# Patient Record
Sex: MALE | Race: WHITE | Employment: FULL TIME | URBAN - NONMETROPOLITAN AREA
[De-identification: names, ages, dates, MRNs, and addresses within clinical notes are randomized per-mention and may not be internally consistent; named-entity substitution may affect disease eponyms.]

---

## 2017-03-21 ENCOUNTER — OFFICE VISIT (OUTPATIENT)
Dept: FAMILY MEDICINE | Facility: OTHER | Age: 22
End: 2017-03-21
Attending: FAMILY MEDICINE
Payer: COMMERCIAL

## 2017-03-21 VITALS
OXYGEN SATURATION: 98 % | TEMPERATURE: 97.4 F | BODY MASS INDEX: 24.33 KG/M2 | HEIGHT: 71 IN | WEIGHT: 173.8 LBS | DIASTOLIC BLOOD PRESSURE: 64 MMHG | HEART RATE: 64 BPM | SYSTOLIC BLOOD PRESSURE: 118 MMHG

## 2017-03-21 DIAGNOSIS — Z20.2 SEXUALLY TRANSMITTED DISEASE EXPOSURE: ICD-10-CM

## 2017-03-21 DIAGNOSIS — Z11.8 SPECIAL SCREENING EXAMINATION FOR CHLAMYDIAL DISEASE: Primary | ICD-10-CM

## 2017-03-21 PROBLEM — Z71.89 ADVANCED DIRECTIVES, COUNSELING/DISCUSSION: Chronic | Status: ACTIVE | Noted: 2017-03-21

## 2017-03-21 PROCEDURE — 87389 HIV-1 AG W/HIV-1&-2 AB AG IA: CPT | Mod: 90 | Performed by: FAMILY MEDICINE

## 2017-03-21 PROCEDURE — 99213 OFFICE O/P EST LOW 20 MIN: CPT | Performed by: FAMILY MEDICINE

## 2017-03-21 PROCEDURE — 87591 N.GONORRHOEAE DNA AMP PROB: CPT | Mod: 90 | Performed by: FAMILY MEDICINE

## 2017-03-21 PROCEDURE — 86780 TREPONEMA PALLIDUM: CPT | Mod: 90 | Performed by: FAMILY MEDICINE

## 2017-03-21 PROCEDURE — 87491 CHLMYD TRACH DNA AMP PROBE: CPT | Mod: 90 | Performed by: FAMILY MEDICINE

## 2017-03-21 PROCEDURE — 99000 SPECIMEN HANDLING OFFICE-LAB: CPT | Performed by: FAMILY MEDICINE

## 2017-03-21 PROCEDURE — 36415 COLL VENOUS BLD VENIPUNCTURE: CPT | Performed by: FAMILY MEDICINE

## 2017-03-21 RX ORDER — AZITHROMYCIN 500 MG/1
1000 TABLET, FILM COATED ORAL ONCE
Qty: 2 TABLET | Refills: 0 | Status: SHIPPED | OUTPATIENT
Start: 2017-03-21 | End: 2017-03-21

## 2017-03-21 ASSESSMENT — PAIN SCALES - GENERAL: PAINLEVEL: NO PAIN (0)

## 2017-03-21 NOTE — MR AVS SNAPSHOT
After Visit Summary   3/21/2017    Alec Baires    MRN: [id number]           Patient Information     Date Of Birth          1995        Visit Information        Provider Department      3/21/2017 3:45 PM aMtt Jimenez MD [facility] [place]        Today's Diagnoses     Special screening examination for chlamydial disease    -  1    Sexually transmitted disease exposure          Care Instructions      Chlamydia Urethritis, Treated (Male)  You have an infection in the channel in the penis that carries urine (urethra). The infection is caused by the bacteria chlamydia. This infection is a sexually transmitted disease (STD). It is highly contagious. It's spread by sexual contact with an infected partner.    Symptoms most often begin within 1 week after you come in contact with the bacteria. But it may take 3 weeks for symptoms to show up. You may have a watery discharge from the penis and burning during urination.   This infection can be treated and cured. Treatment is with antibiotic medicine.  Home care  Follow these guidelines when caring for yourself at home:    Your sexual partner needs to be treated even if he or she has no symptoms. Your partner should contact his or her own health care provider. Or your partner can go to an urgent care clinic or your local health department to be examined and treated.    Avoid sexual activity until both you and your partner have finished all antibiotic medicine. You should wait until your provider tells you that you are no longer contagious.    Take all antibiotic medicine as directed until it is finished. If you stop the medicine before you have finished it, symptoms may come back.    Learn about  safe sex  practices and use these in the future. The safest sex is with a partner who has tested negative and has sex only with you. Condoms can help stop spreading some STDs. These include gonorrhea, chlamydia, and HIV. But condoms are not a  guarantee.  Follow-up care  Follow up with your health care provider, or as advised. If a culture test was taken, you may call as advised for the results. You should have another culture test 4 to 6 weeks after treatment, This is to make sure the infection is gone. Call your provider or your local health department for complete STD screening. This includes HIV testing. Call the [facility] National STD Hotline at 549-050-6262 for more information about STDs.  When to seek medical advice  Call your health care provider right away if any of these occur:    You don t get better after 3 days of treatment    You can t urinate because of the pain    Rash or joint pain    Painful sores on the penis    Enlarged painful lumps (lymph nodes) in the groin    Testicle pain or swelling of the scrotum    Fever greater than 101 F (38.3 C).    Blood in urine     2633-1692 [employer]. [address] [place] [address]. All rights reserved. This information is not intended as a substitute for professional medical care. Always follow your healthcare professional's instructions.        Chlamydia  Chlamydia is a very common sexually transmitted disease (STD). Most people do not have symptoms. Because of this, chlamydia may not be noticed until it causes severe problems. Left untreated, this infection can cause women and men to become sterile. This means they will not be able to have children.     Symptoms  Many people with chlamydia have no symptoms. Women are more likely than men not to have symptoms.  If symptoms show up in women, they include:    Yellow discharge (fluid) from the vagina or anus    Bleeding between periods    Pain or burning during urination  If symptoms show up in men, they include:    Clear discharge (drip) from the penis or anus    Pain or burning during urination  Potential Problems  If the infection is not treated, it can lead to more serious health problems. In women, this can be pelvic  inflammatory disease (PID). PID can make a woman sterile. It can also cause an ectopic (tubal) pregnancy. This type of pregnancy cannot be carried to term. Symptoms of PID include fever, pain during sex, and pain in the abdomen.  Women should get checked for chlamydia regularly. This can help prevent PID.   Treatment  When found early, chlamydia can be treated. It can be cured with antibiotic medications. If you have it, tell your partner right away. Because women often don t have symptoms, men should ask their partners to get tested.  Prevention  Know your partner s history. Protect yourself by using a latex condom whenever you have sex. If you are pregnant, take extra care. Untreated chlamydia in a pregnant woman can cause eye, ear, or lung problems in the baby.  Resources  American Social Health Association STD Hotline  689.426.8396  www.ashastd.org  Centers for Disease Control and Prevention  954.793.2593  www.cdc.gov/std     9599-8384 [place] [address]. All rights reserved. This information is not intended as a substitute for professional medical care. Always follow your healthcare professional's instructions.          Follow-ups after your visit        Who to contact     If you have questions or need follow up information about today's clinic visit or your schedule please contact [facility] directly at [phone].  Normal or non-critical lab and imaging results will be communicated to you by MyChart, letter or phone within 4 business days after the clinic has received the results. If you do not hear from us within 7 days, please contact the clinic through MyChart or phone. If you have a critical or abnormal lab result, we will notify you by phone as soon as possible.  Submit refill requests through [facility] or call your pharmacy and they will forward the refill request to us. Please allow 3 business days for your refill to be completed.           "Additional Information About Your Visit        MyChart Information     [employer] gives you secure access to your electronic health record. If you see a primary care provider, you can also send messages to your care team and make appointments. If you have questions, please call your primary care clinic.  If you do not have a primary care provider, please call [phone] and they will assist you.        Care EveryWhere ID     This is your Care EveryWhere ID. This could be used by other organizations to access your [place] medical records  [id number]        Your Vitals Were     Pulse Temperature Height Pulse Oximetry BMI (Body Mass Index)       64 97.4  F (36.3  C) (Tympanic) 5' 10.5\" (1.791 m) 98% 24.59 kg/m2        Blood Pressure from Last 3 Encounters:   03/21/17 118/64   01/16/15 152/66   12/16/14 108/66    Weight from Last 3 Encounters:   03/21/17 173 lb 12.8 oz (78.8 kg)   01/16/15 154 lb (69.9 kg) (50 %)*   12/15/14 157 lb (71.2 kg) (55 %)*     * Growth percentiles are based on CDC 2-20 Years data.              We Performed the Following     Anti Treponema     Chlamydia trachomatis PCR     HIV Antigen Antibody Combo     Neisseria gonorrhoeae PCR          Today's Medication Changes          These changes are accurate as of: 3/21/17  4:14 PM.  If you have any questions, ask your nurse or doctor.               Start taking these medicines.        Dose/Directions    azithromycin 500 MG tablet   Commonly known as:  ZITHROMAX   Used for:  Special screening examination for chlamydial disease, Sexually transmitted disease exposure   Started by:  Matt Jimenez MD        Dose:  1000 mg   Take 2 tablets (1,000 mg) by mouth once for 1 dose   Quantity:  2 tablet   Refills:  0            Where to get your medicines      These medications were sent to [id number] - [place] - [address] AT [facility] [address] [address]     Phone:  [phone]     azithromycin 500 MG " tablet                Primary Care Provider Office Phone # Fax #    Narcisa GABRIELA Alejo [phone]       [facility] [place] [address]  [place] [address]        Thank you!     Thank you for choosing [facility] [place]  for your care. Our goal is always to provide you with excellent care. Hearing back from our patients is one way we can continue to improve our services. Please take a few minutes to complete the written survey that you may receive in the mail after your visit with us. Thank you!             Your Updated Medication List - Protect others around you: Learn how to safely use, store and throw away your medicines at www.disposemymeds.org.          This list is accurate as of: 3/21/17  4:14 PM.  Always use your most recent med list.                   Brand Name Dispense Instructions for use    augmented betamethasone dipropionate 0.05 % ointment    DIPROLENE-AF    45 g    Apply sparingly to affected area twice daily as needed.  Do not apply to face.       azithromycin 500 MG tablet    ZITHROMAX    2 tablet    Take 2 tablets (1,000 mg) by mouth once for 1 dose       CETAPHIL DAILY ADVANCE Lotn     16 Bottle    Externally apply topically 3 times daily as needed       fluticasone 50 MCG/ACT spray    FLONASE    1 Package    Spray 2 sprays into both nostrils daily       minocycline 100 MG capsule    MINOCIN/DYNACIN    60 capsule    Take 1 capsule (100 mg) by mouth 2 times daily       omeprazole 20 MG tablet     30 tablet    Take 1 tablet (20 mg) by mouth daily Take 30-60 minutes before a meal.       paraben-cetyl and stearyl alcohol-pro gl-SLS lotion     480 mL    Apply topically as needed       ranitidine 150 MG tablet    ZANTAC    60 tablet    Take 1 tablet (150 mg) by mouth 2 times daily       sodium chloride 0.65 % nasal spray    OCEAN    1 Bottle    Spray 2 sprays in nostril 4 times daily Apply aquaphor ointment to both nostrils at night to prevent nose bleeds        Triclosan 0.3 % Bar    CETAPHIL ANTIBACTERIAL    1 Bar    Externally apply 1 Application topically daily

## 2017-03-21 NOTE — PROGRESS NOTES
"  SUBJECTIVE:                                                    Alec Baires is a 21 year old male who presents to clinic today for the following health issues:      STD Screening       Duration: n/a     Description (location/character/radiation): patient would like an STD screening to occur due to girlfriend being tested positive.    Intensity:  mild    Accompanying signs and symptoms: no signs or symptoms     History (similar episodes/previous evaluation): patient states feeling health at current visit no symptoms or feeling sick     Precipitating or alleviating factors: None    Therapies tried and outcome: None    GF got chlamydia in December and again.  Pt treated in December along with GF.    Has been with her over a year    Treated in December with 1 g Zithromax                   Problem list and histories reviewed & adjusted, as indicated.  Additional history: as documented        Reviewed and updated as needed this visit by clinical staff  Tobacco  Allergies  Meds  Med Hx  Surg Hx  Fam Hx  Soc Hx      Reviewed and updated as needed this visit by Provider         ROS:  C: NEGATIVE for fever, chills, change in weight    CV: NEGATIVE for chest pain, palpitations or peripheral edema    OBJECTIVE:                                                    /64 (BP Location: Right arm, Patient Position: Chair, Cuff Size: Adult Large)  Pulse 64  Temp 97.4  F (36.3  C) (Tympanic)  Ht 5' 10.5\" (1.791 m)  Wt 173 lb 12.8 oz (78.8 kg)  SpO2 98%  BMI 24.59 kg/m2  Body mass index is 24.59 kg/(m^2).   GENERAL: healthy, alert, well nourished, well hydrated, no distress  - male: deferred         ASSESSMENT/PLAN:                                                        ICD-10-CM    1. Special screening examination for chlamydial disease Z11.8 Neisseria gonorrhoeae PCR     Chlamydia trachomatis PCR     azithromycin (ZITHROMAX) 500 MG tablet     HIV Antigen Antibody Combo     Anti Treponema   2. Sexually transmitted disease " exposure Z20.2 Neisseria gonorrhoeae PCR     Chlamydia trachomatis PCR     azithromycin (ZITHROMAX) 500 MG tablet     HIV Antigen Antibody Combo     Anti Treponema     Will recheck for STD kyler GC/Chlam.  ?? Resistance  vs failed treatment with GF. Will recheck him in 2 weeks and she / GF should too.  Condoms or no sex til then. Will check HIV and RPR after we discussed. SAfe sex  etc. Symptomatic treatment was discussed along when patient should call and/or come back into the clinic or go to ER/Urgent care. All questions answered.     See Patient Instructions    Matt Jimenez MD  [facility]

## 2017-03-21 NOTE — NURSING NOTE
"Chief Complaint   Patient presents with     STD     patient would like to have an STD screening done        Initial /64 (BP Location: Right arm, Patient Position: Chair, Cuff Size: Adult Large)  Pulse 64  Temp 97.4  F (36.3  C) (Tympanic)  Ht 5' 10.5\" (1.791 m)  Wt 173 lb 12.8 oz (78.8 kg)  SpO2 98%  BMI 24.59 kg/m2 Estimated body mass index is 24.59 kg/(m^2) as calculated from the following:    Height as of this encounter: 5' 10.5\" (1.791 m).    Weight as of this encounter: 173 lb 12.8 oz (78.8 kg).  Medication Reconciliation: complete   Tosha Turner CMA([facility])   "

## 2017-03-21 NOTE — PATIENT INSTRUCTIONS
Chlamydia Urethritis, Treated (Male)  You have an infection in the channel in the penis that carries urine (urethra). The infection is caused by the bacteria chlamydia. This infection is a sexually transmitted disease (STD). It is highly contagious. It's spread by sexual contact with an infected partner.    Symptoms most often begin within 1 week after you come in contact with the bacteria. But it may take 3 weeks for symptoms to show up. You may have a watery discharge from the penis and burning during urination.   This infection can be treated and cured. Treatment is with antibiotic medicine.  Home care  Follow these guidelines when caring for yourself at home:    Your sexual partner needs to be treated even if he or she has no symptoms. Your partner should contact his or her own health care provider. Or your partner can go to an urgent care clinic or your local health department to be examined and treated.    Avoid sexual activity until both you and your partner have finished all antibiotic medicine. You should wait until your provider tells you that you are no longer contagious.    Take all antibiotic medicine as directed until it is finished. If you stop the medicine before you have finished it, symptoms may come back.    Learn about  safe sex  practices and use these in the future. The safest sex is with a partner who has tested negative and has sex only with you. Condoms can help stop spreading some STDs. These include gonorrhea, chlamydia, and HIV. But condoms are not a guarantee.  Follow-up care  Follow up with your health care provider, or as advised. If a culture test was taken, you may call as advised for the results. You should have another culture test 4 to 6 weeks after treatment, This is to make sure the infection is gone. Call your provider or your local health department for complete STD screening. This includes HIV testing. Call the CDC National STD Hotline at 742-021-4516 for more information  about STDs.  When to seek medical advice  Call your health care provider right away if any of these occur:    You don t get better after 3 days of treatment    You can t urinate because of the pain    Rash or joint pain    Painful sores on the penis    Enlarged painful lumps (lymph nodes) in the groin    Testicle pain or swelling of the scrotum    Fever greater than 101 F (38.3 C).    Blood in urine     4499-9934 [employer]. [address] [place] [address]. All rights reserved. This information is not intended as a substitute for professional medical care. Always follow your healthcare professional's instructions.        Chlamydia  Chlamydia is a very common sexually transmitted disease (STD). Most people do not have symptoms. Because of this, chlamydia may not be noticed until it causes severe problems. Left untreated, this infection can cause women and men to become sterile. This means they will not be able to have children.     Symptoms  Many people with chlamydia have no symptoms. Women are more likely than men not to have symptoms.  If symptoms show up in women, they include:    Yellow discharge (fluid) from the vagina or anus    Bleeding between periods    Pain or burning during urination  If symptoms show up in men, they include:    Clear discharge (drip) from the penis or anus    Pain or burning during urination  Potential Problems  If the infection is not treated, it can lead to more serious health problems. In women, this can be pelvic inflammatory disease (PID). PID can make a woman sterile. It can also cause an ectopic (tubal) pregnancy. This type of pregnancy cannot be carried to term. Symptoms of PID include fever, pain during sex, and pain in the abdomen.  Women should get checked for chlamydia regularly. This can help prevent PID.   Treatment  When found early, chlamydia can be treated. It can be cured with antibiotic medications. If you have it, tell your partner right away.  Because women often don t have symptoms, men should ask their partners to get tested.  Prevention  Know your partner s history. Protect yourself by using a latex condom whenever you have sex. If you are pregnant, take extra care. Untreated chlamydia in a pregnant woman can cause eye, ear, or lung problems in the baby.  Resources  American Social Health Association STD Hotline  966.322.7921  www.ashastd.org  Centers for Disease Control and Prevention  494.813.3641  www.cdc.gov/std     7466-0024 [employer], [place] [address]. All rights reserved. This information is not intended as a substitute for professional medical care. Always follow your healthcare professional's instructions.

## 2017-03-23 LAB
C TRACH DNA SPEC QL NAA+PROBE: ABNORMAL
HIV 1+2 AB+HIV1 P24 AG SERPL QL IA: NORMAL
N GONORRHOEA DNA SPEC QL NAA+PROBE: NORMAL
SPECIMEN SOURCE: ABNORMAL
SPECIMEN SOURCE: NORMAL
T PALLIDUM IGG+IGM SER QL: NEGATIVE

## 2017-04-03 ENCOUNTER — TELEPHONE (OUTPATIENT)
Dept: FAMILY MEDICINE | Facility: OTHER | Age: 22
End: 2017-04-03

## 2017-04-03 NOTE — TELEPHONE ENCOUNTER
Pt called and was wondering if he could come in tomorrow at 4:30 instead? He has training for work out of town and won't be back until that time. Please call him back at [phone]

## 2017-04-19 ENCOUNTER — OFFICE VISIT (OUTPATIENT)
Dept: FAMILY MEDICINE | Facility: OTHER | Age: 22
End: 2017-04-19
Attending: FAMILY MEDICINE
Payer: COMMERCIAL

## 2017-04-19 VITALS
OXYGEN SATURATION: 98 % | HEART RATE: 56 BPM | DIASTOLIC BLOOD PRESSURE: 72 MMHG | RESPIRATION RATE: 17 BRPM | WEIGHT: 170 LBS | TEMPERATURE: 97.8 F | SYSTOLIC BLOOD PRESSURE: 126 MMHG | BODY MASS INDEX: 24.05 KG/M2

## 2017-04-19 DIAGNOSIS — A56.00: Primary | ICD-10-CM

## 2017-04-19 PROCEDURE — 99000 SPECIMEN HANDLING OFFICE-LAB: CPT | Performed by: FAMILY MEDICINE

## 2017-04-19 PROCEDURE — 87491 CHLMYD TRACH DNA AMP PROBE: CPT | Mod: 90 | Performed by: FAMILY MEDICINE

## 2017-04-19 PROCEDURE — 99213 OFFICE O/P EST LOW 20 MIN: CPT | Performed by: FAMILY MEDICINE

## 2017-04-19 ASSESSMENT — PAIN SCALES - GENERAL: PAINLEVEL: NO PAIN (0)

## 2017-04-19 NOTE — NURSING NOTE
"Chief Complaint   Patient presents with     STD       Initial /72  Pulse 56  Temp 97.8  F (36.6  C)  Resp 17  Wt 170 lb (77.1 kg)  SpO2 98%  BMI 24.05 kg/m2 Estimated body mass index is 24.05 kg/(m^2) as calculated from the following:    Height as of 3/21/17: 5' 10.5\" (1.791 m).    Weight as of this encounter: 170 lb (77.1 kg).  Medication Reconciliation: complete  "

## 2017-04-19 NOTE — MR AVS SNAPSHOT
After Visit Summary   4/19/2017    Alec Baires    MRN: [id number]           Patient Information     Date Of Birth          1995        Visit Information        Provider Department      4/19/2017 2:45 PM Matt Jimenez MD [facility] [place]        Today's Diagnoses     Chlamydial infection of lower genitourinary tract    -  1      Care Instructions    l call with results.          Follow-ups after your visit        Who to contact     If you have questions or need follow up information about today's clinic visit or your schedule please contact [facility] [place] directly at [phone].  Normal or non-critical lab and imaging results will be communicated to you by [employer], letter or phone within 4 business days after the clinic has received the results. If you do not hear from us within 7 days, please contact the clinic through [employer] or phone. If you have a critical or abnormal lab result, we will notify you by phone as soon as possible.  Submit refill requests through [employer] or call your pharmacy and they will forward the refill request to us. Please allow 3 business days for your refill to be completed.          Additional Information About Your Visit        MyChart Information     [employer] gives you secure access to your electronic health record. If you see a primary care provider, you can also send messages to your care team and make appointments. If you have questions, please call your primary care clinic.  If you do not have a primary care provider, please call [phone] and they will assist you.        Care EveryWhere ID     This is your Care EveryWhere ID. This could be used by other organizations to access your [place] medical records  [id number]        Your Vitals Were     Pulse Temperature Respirations Pulse Oximetry BMI (Body Mass Index)       56 97.8  F (36.6  C) 17 98% 24.05 kg/m2        Blood Pressure from Last 3 Encounters:   04/19/17 126/72   03/21/17 118/64    01/16/15 152/66    Weight from Last 3 Encounters:   04/19/17 170 lb (77.1 kg)   03/21/17 173 lb 12.8 oz (78.8 kg)   01/16/15 154 lb (69.9 kg) (50 %)*     * Growth percentiles are based on [facility] 2-20 Years data.              We Performed the Following     Chlamydia trachomatis PCR        Primary Care Provider Office Phone # Fax #    GABRIELA Moreno [phone]       [facility] [place] [address]  [place] [address]        Thank you!     Thank you for choosing [facility] [place]  for your care. Our goal is always to provide you with excellent care. Hearing back from our patients is one way we can continue to improve our services. Please take a few minutes to complete the written survey that you may receive in the mail after your visit with us. Thank you!             Your Updated Medication List - Protect others around you: Learn how to safely use, store and throw away your medicines at www.disposemymeds.org.          This list is accurate as of: 4/19/17  3:01 PM.  Always use your most recent med list.                   Brand Name Dispense Instructions for use    augmented betamethasone dipropionate 0.05 % ointment    DIPROLENE-AF    45 g    Apply sparingly to affected area twice daily as needed.  Do not apply to face.       CETAPHIL DAILY ADVANCE Lotn     16 Bottle    Externally apply topically 3 times daily as needed       fluticasone 50 MCG/ACT spray    FLONASE    1 Package    Spray 2 sprays into both nostrils daily       minocycline 100 MG capsule    MINOCIN/DYNACIN    60 capsule    Take 1 capsule (100 mg) by mouth 2 times daily       omeprazole 20 MG tablet     30 tablet    Take 1 tablet (20 mg) by mouth daily Take 30-60 minutes before a meal.       paraben-cetyl and stearyl alcohol-pro gl-SLS lotion     480 mL    Apply topically as needed       ranitidine 150 MG tablet    ZANTAC    60 tablet    Take 1 tablet (150 mg) by mouth 2 times daily       sodium chloride 0.65 % nasal  spray    OCEAN    1 Bottle    Spray 2 sprays in nostril 4 times daily Apply aquaphor ointment to both nostrils at night to prevent nose bleeds       Triclosan 0.3 % Bar    CETAPHIL ANTIBACTERIAL    1 Bar    Externally apply 1 Application topically daily

## 2017-04-21 LAB
C TRACH DNA SPEC QL NAA+PROBE: NORMAL
SPECIMEN SOURCE: NORMAL

## 2020-03-02 ENCOUNTER — HEALTH MAINTENANCE LETTER (OUTPATIENT)
Age: 25
End: 2020-03-02

## 2020-06-28 ENCOUNTER — APPOINTMENT (OUTPATIENT)
Dept: CT IMAGING | Facility: HOSPITAL | Age: 25
End: 2020-06-28
Attending: PHYSICIAN ASSISTANT

## 2020-06-28 ENCOUNTER — HOSPITAL ENCOUNTER (EMERGENCY)
Facility: HOSPITAL | Age: 25
Discharge: HOME | End: 2020-06-28
Attending: PHYSICIAN ASSISTANT | Admitting: PHYSICIAN ASSISTANT

## 2020-06-28 VITALS
HEART RATE: 88 BPM | DIASTOLIC BLOOD PRESSURE: 68 MMHG | TEMPERATURE: 99.3 F | SYSTOLIC BLOOD PRESSURE: 140 MMHG | RESPIRATION RATE: 16 BRPM | OXYGEN SATURATION: 97 %

## 2020-06-28 DIAGNOSIS — K52.9 COLITIS: Primary | ICD-10-CM

## 2020-06-28 LAB
ALBUMIN SERPL-MCNC: 3.7 G/DL (ref 3.4–5)
ALBUMIN UR-MCNC: 30 MG/DL
ALP SERPL-CCNC: 72 U/L (ref 40–150)
ALT SERPL W P-5'-P-CCNC: 28 U/L (ref 0–70)
ANION GAP SERPL CALCULATED.3IONS-SCNC: 6 MMOL/L (ref 3–14)
APPEARANCE UR: CLEAR
AST SERPL W P-5'-P-CCNC: 17 U/L (ref 0–45)
BACTERIA #/AREA URNS HPF: ABNORMAL /HPF
BASOPHILS # BLD AUTO: 0.1 10E9/L (ref 0–0.2)
BASOPHILS NFR BLD AUTO: 0.4 %
BILIRUB SERPL-MCNC: 0.5 MG/DL (ref 0.2–1.3)
BILIRUB UR QL STRIP: NEGATIVE
BUN SERPL-MCNC: 13 MG/DL (ref 7–30)
CALCIUM SERPL-MCNC: 9.1 MG/DL (ref 8.5–10.1)
CHLORIDE SERPL-SCNC: 101 MMOL/L (ref 94–109)
CO2 SERPL-SCNC: 27 MMOL/L (ref 20–32)
COLOR UR AUTO: YELLOW
CREAT SERPL-MCNC: 1 MG/DL (ref 0.66–1.25)
CRP SERPL-MCNC: 144 MG/L (ref 0–8)
DIFFERENTIAL METHOD BLD: ABNORMAL
EOSINOPHIL # BLD AUTO: 0 10E9/L (ref 0–0.7)
EOSINOPHIL NFR BLD AUTO: 0 %
ERYTHROCYTE [DISTWIDTH] IN BLOOD BY AUTOMATED COUNT: 11.7 % (ref 10–15)
GFR SERPL CREATININE-BSD FRML MDRD: >90 ML/MIN/{1.73_M2}
GLUCOSE SERPL-MCNC: 94 MG/DL (ref 70–99)
GLUCOSE UR STRIP-MCNC: NEGATIVE MG/DL
HCT VFR BLD AUTO: 41.9 % (ref 40–53)
HGB BLD-MCNC: 14.8 G/DL (ref 13.3–17.7)
HGB UR QL STRIP: ABNORMAL
IMM GRANULOCYTES # BLD: 0.1 10E9/L (ref 0–0.4)
IMM GRANULOCYTES NFR BLD: 0.7 %
KETONES UR STRIP-MCNC: >150 MG/DL
LEUKOCYTE ESTERASE UR QL STRIP: NEGATIVE
LYMPHOCYTES # BLD AUTO: 1.1 10E9/L (ref 0.8–5.3)
LYMPHOCYTES NFR BLD AUTO: 8.9 %
MCH RBC QN AUTO: 31.5 PG (ref 26.5–33)
MCHC RBC AUTO-ENTMCNC: 35.3 G/DL (ref 31.5–36.5)
MCV RBC AUTO: 89 FL (ref 78–100)
MONOCYTES # BLD AUTO: 1.6 10E9/L (ref 0–1.3)
MONOCYTES NFR BLD AUTO: 13.3 %
MUCOUS THREADS #/AREA URNS LPF: PRESENT /LPF
NEUTROPHILS # BLD AUTO: 9.1 10E9/L (ref 1.6–8.3)
NEUTROPHILS NFR BLD AUTO: 76.7 %
NITRATE UR QL: NEGATIVE
NRBC # BLD AUTO: 0 10*3/UL
NRBC BLD AUTO-RTO: 0 /100
PH UR STRIP: 6 PH (ref 4.7–8)
PLATELET # BLD AUTO: 232 10E9/L (ref 150–450)
POTASSIUM SERPL-SCNC: 3.9 MMOL/L (ref 3.4–5.3)
PROT SERPL-MCNC: 8.5 G/DL (ref 6.8–8.8)
RBC # BLD AUTO: 4.7 10E12/L (ref 4.4–5.9)
RBC #/AREA URNS AUTO: 7 /HPF (ref 0–2)
SODIUM SERPL-SCNC: 134 MMOL/L (ref 133–144)
SOURCE: ABNORMAL
SP GR UR STRIP: 1.02 (ref 1–1.03)
UROBILINOGEN UR STRIP-MCNC: NORMAL MG/DL (ref 0–2)
WBC # BLD AUTO: 11.9 10E9/L (ref 4–11)
WBC #/AREA URNS AUTO: 2 /HPF (ref 0–5)

## 2020-06-28 PROCEDURE — 74177 CT ABD & PELVIS W/CONTRAST: CPT | Mod: TC

## 2020-06-28 PROCEDURE — 25000132 ZZH RX MED GY IP 250 OP 250 PS 637: Performed by: PHYSICIAN ASSISTANT

## 2020-06-28 PROCEDURE — 81001 URINALYSIS AUTO W/SCOPE: CPT | Performed by: PHYSICIAN ASSISTANT

## 2020-06-28 PROCEDURE — 99284 EMERGENCY DEPT VISIT MOD MDM: CPT | Mod: Z6 | Performed by: PHYSICIAN ASSISTANT

## 2020-06-28 PROCEDURE — 80053 COMPREHEN METABOLIC PANEL: CPT | Performed by: PHYSICIAN ASSISTANT

## 2020-06-28 PROCEDURE — 86140 C-REACTIVE PROTEIN: CPT | Performed by: PHYSICIAN ASSISTANT

## 2020-06-28 PROCEDURE — 99285 EMERGENCY DEPT VISIT HI MDM: CPT | Mod: 25

## 2020-06-28 PROCEDURE — 25500064 ZZH RX 255 OP 636: Performed by: PHYSICIAN ASSISTANT

## 2020-06-28 PROCEDURE — 25000128 H RX IP 250 OP 636: Performed by: PHYSICIAN ASSISTANT

## 2020-06-28 PROCEDURE — 36415 COLL VENOUS BLD VENIPUNCTURE: CPT | Performed by: PHYSICIAN ASSISTANT

## 2020-06-28 PROCEDURE — 85025 COMPLETE CBC W/AUTO DIFF WBC: CPT | Performed by: PHYSICIAN ASSISTANT

## 2020-06-28 PROCEDURE — 96361 HYDRATE IV INFUSION ADD-ON: CPT

## 2020-06-28 PROCEDURE — 96375 TX/PRO/DX INJ NEW DRUG ADDON: CPT

## 2020-06-28 PROCEDURE — 25800030 ZZH RX IP 258 OP 636: Performed by: PHYSICIAN ASSISTANT

## 2020-06-28 PROCEDURE — 96374 THER/PROPH/DIAG INJ IV PUSH: CPT | Mod: XU

## 2020-06-28 RX ORDER — KETOROLAC TROMETHAMINE 30 MG/ML
30 INJECTION, SOLUTION INTRAMUSCULAR; INTRAVENOUS ONCE
Status: COMPLETED | OUTPATIENT
Start: 2020-06-28 | End: 2020-06-28

## 2020-06-28 RX ORDER — IOPAMIDOL 612 MG/ML
100 INJECTION, SOLUTION INTRAVASCULAR ONCE
Status: COMPLETED | OUTPATIENT
Start: 2020-06-28 | End: 2020-06-28

## 2020-06-28 RX ORDER — CIPROFLOXACIN 500 MG/1
500 TABLET, FILM COATED ORAL 2 TIMES DAILY
Qty: 14 TABLET | Refills: 0 | Status: SHIPPED | OUTPATIENT
Start: 2020-06-28 | End: 2020-07-05

## 2020-06-28 RX ORDER — METRONIDAZOLE 500 MG/1
500 TABLET ORAL ONCE
Status: COMPLETED | OUTPATIENT
Start: 2020-06-28 | End: 2020-06-28

## 2020-06-28 RX ORDER — METRONIDAZOLE 500 MG/1
500 TABLET ORAL 3 TIMES DAILY
Qty: 21 TABLET | Refills: 0 | Status: SHIPPED | OUTPATIENT
Start: 2020-06-28 | End: 2020-07-05

## 2020-06-28 RX ORDER — CIPROFLOXACIN 500 MG/1
500 TABLET, FILM COATED ORAL ONCE
Status: COMPLETED | OUTPATIENT
Start: 2020-06-28 | End: 2020-06-28

## 2020-06-28 RX ORDER — ONDANSETRON 2 MG/ML
4 INJECTION INTRAMUSCULAR; INTRAVENOUS EVERY 30 MIN PRN
Status: DISCONTINUED | OUTPATIENT
Start: 2020-06-28 | End: 2020-06-28 | Stop reason: HOSPADM

## 2020-06-28 RX ADMIN — CIPROFLOXACIN HYDROCHLORIDE 500 MG: 500 TABLET, FILM COATED ORAL at 19:14

## 2020-06-28 RX ADMIN — SODIUM CHLORIDE 1000 ML: 9 INJECTION, SOLUTION INTRAVENOUS at 18:02

## 2020-06-28 RX ADMIN — KETOROLAC TROMETHAMINE 30 MG: 30 INJECTION, SOLUTION INTRAMUSCULAR at 18:03

## 2020-06-28 RX ADMIN — ONDANSETRON 4 MG: 2 INJECTION INTRAMUSCULAR; INTRAVENOUS at 18:02

## 2020-06-28 RX ADMIN — IOPAMIDOL 100 ML: 612 INJECTION, SOLUTION INTRAVENOUS at 18:42

## 2020-06-28 RX ADMIN — METRONIDAZOLE 500 MG: 500 TABLET ORAL at 19:14

## 2020-06-28 ASSESSMENT — ENCOUNTER SYMPTOMS
BLOOD IN STOOL: 0
NAUSEA: 1
BRUISES/BLEEDS EASILY: 0
CHILLS: 0
APPETITE CHANGE: 1
VOMITING: 1
DIARRHEA: 1
ABDOMINAL PAIN: 1
FEVER: 0
SHORTNESS OF BREATH: 0

## 2020-06-28 NOTE — ED NOTES
Patient presents with complaints of abdominal pain that started Friday evening and then yesterday states he had nausea, vomiting, diarrhea, and abdominal cramping. Thought he would maybe come in yesterday but then today when he still wasn't feeling well he decided to come in. States he hasn't really been able to eat anything since Friday, but has been taking tums and pepto for it all yesterday and states not much to drink either as he hasn't been able to tolerate it.

## 2020-06-28 NOTE — DISCHARGE INSTRUCTIONS
What to expect when you have contrast    During your exam, we will inject  contrast  into your vein or artery. (Contrast is a clear liquid with iodine in it. It shows up on X-rays.)    You may feel warm or hot. You may have a metal taste in your mouth and a slight upset stomach. You may also feel pressure near the kidneys and bladder. These effects will last about 1 to 3 minutes.    Please tell us if you have:   Sneezing    Itching   Hives    Swelling in the face   A hoarse voice   Breathing problems   Other new symptoms    Serious problems are rare.  They may include:   Irregular heartbeat    Seizures   Kidney failure             Tissue damage   Shock     Death    If you have any problems during the exam, we  will treat them right away.    When you get home    Call your hospital if you have any new symptoms in the next 2 days, like hives or swelling. (Phone numbers are at the bottom of this page.) Or call your family doctor.     If you have wheezing or trouble breathing, call 911.    Self-care  -Drink at least 4 extra glasses of water today.   This reduces the stress on your kidneys.  -Keep taking your regular medicines.    The contrast will pass out of your body in your  Urine(pee). This will happen in the next 24 hours. You  will not feel this. Your urine will not  change color.    If you have kidney problems or take metformin    Drink 4 to 8 large glasses of water for the next  2 days, if you are not on a fluid restriction.    ?If you take metformin (Glucophage or Glucovance) for diabetes, keep taking it.      ?Your kidney function tests are abnormal.  If you take Metformin, do not take it for 48 hours. Please go to your clinic for a blood test within 3 days after your exam before the restarting this medicine.     (Note to provider:please give patient prescription for lab tests.)    ?Special instructions: -    I have read and understand the above information.    Patient Sign  Here:______________________________________Date:________Time:______    Staff Sign Here:________________________________________Date:_______Time:______      Radiology Departments:     ?[facility]: [phone] ?[place]: [phone]     ?[place]: [phone] ?[facility]:[phone]      ?[place]: [phone]  ?[place]: [phone]  ?[place]:[phone]    ?[facility] [place]:[phone]  ?[facility]:[phone]    Please start the Cipro and Flagyl as prescribed.    Collect a stool sample and bring it into the lab.    Follow-up in the clinic in the next 3 to 4 days.    Rest and stay hydrated.    Good hand hygiene.    Return here for any worsening symptoms, new symptoms, or other concerns.

## 2020-06-28 NOTE — ED AVS SNAPSHOT
[facility]  [address] [address]  Phone:  [phone]                                    Alec Baires   MRN: [id number]    Department:  [facility]   Date of Visit:  6/28/2020           After Visit Summary Signature Page    I have received my discharge instructions, and my questions have been answered. I have discussed any challenges I see with this plan with the nurse or doctor.    ..........................................................................................................................................  Patient/Patient Representative Signature      ..........................................................................................................................................  Patient Representative Print Name and Relationship to Patient    ..................................................               ................................................  Date                                   Time    ..........................................................................................................................................  Reviewed by Signature/Title    ...................................................              ..............................................  Date                                               Time          22EPIC Rev 08/18

## 2020-06-28 NOTE — ED PROVIDER NOTES
History     Chief Complaint   Patient presents with     Abdominal Pain     The history is provided by the patient.     Alec Baires is a 24 year old male who presented to the emergency department ambulatory for evaluation of a 48-hour history of lower abdominal pain, vomiting, and diarrhea.  Denies any hematochezia, melena, hematemesis.  He has been using Pepto-Bismol.  Pain is described as a 6 and cramping.  Worse in the right lower quadrant.  Denies any lower urinary tract symptoms.    Allergies:  No Known Allergies    Problem List:    Patient Active Problem List    Diagnosis Date Noted     Chlamydial infection of lower genitourinary tract 04/19/2017     Priority: Medium     Advanced directives, counseling/discussion 03/21/2017     Priority: Medium     Advance Care Planning 3/21/2017: ACP Review of Chart / Resources Provided:  Reviewed chart for advance care plan.  Alec Baires has no plan or code status on file. Discussed available resources and provided with information. Confirmed code status reflects current choices pending further ACP discussions.  Confirmed/documented legally designated decision makers.  Added by Tosha Turner             Nasal turbinate hypertrophy 11/07/2014     Priority: Medium     DNS (deviated nasal septum) 11/07/2014     Priority: Medium     Nose fracture 11/07/2014     Priority: Medium        Past Medical History:    No past medical history on file.    Past Surgical History:    No past surgical history on file.    Family History:    Family History   Problem Relation Age of Onset     Congenital Anomalies Maternal Grandmother        Social History:  Marital Status:  Single [1]  Social History     Tobacco Use     Smoking status: Never Smoker     Smokeless tobacco: Current User     Types: Chew   Substance Use Topics     Alcohol use: Yes     Comment: sometimes     Drug use: Yes     Comment: marijuana        Medications:    ciprofloxacin (CIPRO) 500 MG tablet  metroNIDAZOLE (FLAGYL) 500  MG tablet  augmented betamethasone dipropionate (DIPROLENE-AF) 0.05 % ointment  Emollient (CETAPHIL DAILY ADVANCE) LOTN  fluticasone (FLONASE) 50 MCG/ACT nasal spray  minocycline (MINOCIN,DYNACIN) 100 MG capsule  omeprazole 20 MG tablet  ranitidine (ZANTAC) 150 MG tablet  Soap & Cleansers (PARABEN-CETYL AND STEARYL ALCOHOL-PRO GL-SLS) external solution  sodium chloride (OCEAN) 0.65 % nasal spray  Triclosan (CETAPHIL ANTIBACTERIAL) 0.3 % BAR          Review of Systems   Constitutional: Positive for appetite change. Negative for chills and fever.   Respiratory: Negative for shortness of breath.    Gastrointestinal: Positive for abdominal pain, diarrhea, nausea and vomiting. Negative for blood in stool.   Genitourinary: Negative.    Skin: Negative.    Hematological: Does not bruise/bleed easily.       Physical Exam   BP: 127/76  Pulse: 97  Temp: 99.6  F (37.6  C)  Resp: 16  SpO2: 99 %      Physical Exam  Vitals signs and nursing note reviewed.   Constitutional:       General: He is not in acute distress.     Appearance: Normal appearance. He is normal weight. He is not ill-appearing, toxic-appearing or diaphoretic.   Cardiovascular:      Rate and Rhythm: Normal rate and regular rhythm.   Pulmonary:      Effort: Pulmonary effort is normal.   Abdominal:      Palpations: Abdomen is soft.      Tenderness: There is abdominal tenderness. There is no guarding.       Skin:     General: Skin is warm and dry.      Capillary Refill: Capillary refill takes less than 2 seconds.   Neurological:      General: No focal deficit present.      Mental Status: He is alert and oriented to person, place, and time.   Psychiatric:         Mood and Affect: Mood normal.         ED Course        Procedures               Critical Care time:  none               Results for orders placed or performed during the hospital encounter of 06/28/20 (from the past 24 hour(s))   CBC with platelets differential   Result Value Ref [place]    WBC 11.9 (H) 4.0 -  11.0 10e9/L    RBC Count 4.70 4.4 - 5.9 10e12/L    Hemoglobin 14.8 13.3 - 17.7 g/dL    Hematocrit 41.9 40.0 - 53.0 %    MCV 89 78 - 100 fl    MCH 31.5 26.5 - 33.0 pg    MCHC 35.3 31.5 - 36.5 g/dL    RDW 11.7 10.0 - 15.0 %    Platelet Count 232 150 - 450 10e9/L    Diff Method Automated Method     % Neutrophils 76.7 %    % Lymphocytes 8.9 %    % Monocytes 13.3 %    % Eosinophils 0.0 %    % Basophils 0.4 %    % Immature Granulocytes 0.7 %    Nucleated RBCs 0 0 /100    Absolute Neutrophil 9.1 (H) 1.6 - 8.3 10e9/L    Absolute Lymphocytes 1.1 0.8 - 5.3 10e9/L    Absolute Monocytes 1.6 (H) 0.0 - 1.3 10e9/L    Absolute Eosinophils 0.0 0.0 - 0.7 10e9/L    Absolute Basophils 0.1 0.0 - 0.2 10e9/L    Abs Immature Granulocytes 0.1 0 - 0.4 10e9/L    Absolute Nucleated RBC 0.0    Comprehensive metabolic panel   Result Value Ref [place]    Sodium 134 133 - 144 mmol/L    Potassium 3.9 3.4 - 5.3 mmol/L    Chloride 101 94 - 109 mmol/L    Carbon Dioxide 27 20 - 32 mmol/L    Anion Gap 6 3 - 14 mmol/L    Glucose 94 70 - 99 mg/dL    Urea Nitrogen 13 7 - 30 mg/dL    Creatinine 1.00 0.66 - 1.25 mg/dL    GFR Estimate >90 >60 mL/min/[1.73_m2]    GFR Estimate If Black >90 >60 mL/min/[1.73_m2]    Calcium 9.1 8.5 - 10.1 mg/dL    Bilirubin Total 0.5 0.2 - 1.3 mg/dL    Albumin 3.7 3.4 - 5.0 g/dL    Protein Total 8.5 6.8 - 8.8 g/dL    Alkaline Phosphatase 72 40 - 150 U/L    ALT 28 0 - 70 U/L    AST 17 0 - 45 U/L   CRP inflammation   Result Value Ref [place]    CRP Inflammation 144.0 (H) 0.0 - 8.0 mg/L   CT Abdomen Pelvis w Contrast    Narrative    PROCEDURE:  CT ABDOMEN PELVIS W CONTRAST    HISTORY: Right lower quadrant abdominal pain    TECHNIQUE:  Helical CT of the abdomen and pelvis was performed  following injection of intravenous contrast.     COMPARISON:  None.    MEDS/CONTRAST: ISOVUE 300  100ML    FINDINGS:      Limited images through the lung bases demonstrate no focal  consolidation or mass.  The heart size is normal. No pericardial  or  pleural effusions are seen.    The liver demonstrates no mass or intrahepatic biliary ductal  dilatation.  The gallbladder, spleen, pancreas and adrenal glands are  unremarkable.  Symmetric nephrograms are present without  hydronephrosis. There is no abdominal aortic aneurysm.    The bowel is normal in caliber. The appendix is borderline dilated,  measuring 78 mm. No focal periappendiceal inflammation is seen. There  are multiple prominent cecal mesentery nodes. There is mild wall  thickening of the colon diffusely, relatively sparing the sigmoid  colon.    No free air or adenopathy is present.  No suspicious osseous lesions  are identified.      Impression    IMPRESSION:      Findings most consistent with mild diffuse colitis, likely infectious  or inflammatory. There is relative sparing of the sigmoid colon and  rectum, with involvement best seen in the ascending colon. Mildly  prominent lymph nodes in the right colon mesentery are most likely  reactive. Consider follow-up colonoscopy if symptoms are recurrent or  persistent.    Upper normal caliber appendix, measuring between 7 and 8 mm without  focal periappendiceal inflammation.    CARRILLO INTERIANO MD       Medications   ondansetron (ZOFRAN) injection 4 mg (4 mg Intravenous Given 6/28/20 1802)   0.9% sodium chloride BOLUS (0 mLs Intravenous Stopped 6/28/20 1914)   ketorolac (TORADOL) injection 30 mg (30 mg Intravenous Given 6/28/20 1803)   sodium chloride (PF) 0.9% PF flush 60 mL (50 mLs Intravenous Given 6/28/20 1842)   iopamidol (ISOVUE-300) IV solution 61% 100 mL (100 mLs Intravenous Given 6/28/20 1842)   ciprofloxacin (CIPRO) tablet 500 mg (500 mg Oral Given 6/28/20 1914)   metroNIDAZOLE (FLAGYL) tablet 500 mg (500 mg Oral Given 6/28/20 1914)       Assessments & Plan (with Medical Decision Making)   Work-up as above.  No CT evidence of acute appendicitis.  Diffuse colitis.  Young and healthy.  We will treat with Cipro and Flagyl as well as stool  studies.  Long and detailed discussion with the patient.  He voiced complete understanding and was happy and agreeable.  Follow-up in the clinic.  Return here for any worsening symptoms, new symptoms, or other concerns.    This document was prepared using a combination of typing and voice generated software.  While every attempt was made for accuracy, spelling and grammatical errors may exist.    I have reviewed the nursing notes.    I have reviewed the findings, diagnosis, plan and need for follow up with the patient.       New Prescriptions    CIPROFLOXACIN (CIPRO) 500 MG TABLET    Take 1 tablet (500 mg) by mouth 2 times daily for 7 days    METRONIDAZOLE (FLAGYL) 500 MG TABLET    Take 1 tablet (500 mg) by mouth 3 times daily for 7 days       Final diagnoses:   Colitis       6/28/2020   [facility]     Ashley Wharton PA-C  06/28/20 1920

## 2021-04-18 ENCOUNTER — HOSPITAL ENCOUNTER (EMERGENCY)
Facility: OTHER | Age: 26
Discharge: HOME | End: 2021-04-18
Attending: FAMILY MEDICINE | Admitting: FAMILY MEDICINE
Payer: COMMERCIAL

## 2021-04-18 ENCOUNTER — HEALTH MAINTENANCE LETTER (OUTPATIENT)
Age: 26
End: 2021-04-18

## 2021-04-18 VITALS
HEART RATE: 90 BPM | DIASTOLIC BLOOD PRESSURE: 65 MMHG | WEIGHT: 170 LBS | OXYGEN SATURATION: 97 % | RESPIRATION RATE: 16 BRPM | TEMPERATURE: 98 F | BODY MASS INDEX: 23.8 KG/M2 | SYSTOLIC BLOOD PRESSURE: 119 MMHG | HEIGHT: 71 IN

## 2021-04-18 DIAGNOSIS — F10.920 ALCOHOLIC INTOXICATION WITHOUT COMPLICATION (H): ICD-10-CM

## 2021-04-18 PROCEDURE — 96360 HYDRATION IV INFUSION INIT: CPT | Performed by: FAMILY MEDICINE

## 2021-04-18 PROCEDURE — 96361 HYDRATE IV INFUSION ADD-ON: CPT | Performed by: FAMILY MEDICINE

## 2021-04-18 PROCEDURE — 99282 EMERGENCY DEPT VISIT SF MDM: CPT | Performed by: FAMILY MEDICINE

## 2021-04-18 PROCEDURE — 258N000003 HC RX IP 258 OP 636: Performed by: FAMILY MEDICINE

## 2021-04-18 PROCEDURE — 99283 EMERGENCY DEPT VISIT LOW MDM: CPT | Mod: 25 | Performed by: FAMILY MEDICINE

## 2021-04-18 RX ORDER — SODIUM CHLORIDE 9 MG/ML
INJECTION, SOLUTION INTRAVENOUS CONTINUOUS
Status: DISCONTINUED | OUTPATIENT
Start: 2021-04-18 | End: 2021-04-18 | Stop reason: HOSPADM

## 2021-04-18 RX ADMIN — SODIUM CHLORIDE 1000 ML: 9 INJECTION, SOLUTION INTRAVENOUS at 02:16

## 2021-04-18 RX ADMIN — SODIUM CHLORIDE: 9 INJECTION, SOLUTION INTRAVENOUS at 03:17

## 2021-04-18 ASSESSMENT — MIFFLIN-ST. JEOR: SCORE: 1778.24

## 2021-04-18 NOTE — ED TRIAGE NOTES
Pt arrived per stretcher by EMS after being brought here from [facility] after being found unresponsive in a martinez at the bar and a   lowered him to the floor and noted Pt was have white sputum discharge from his mouth. Per report once pt lowered to floor he became alert and oriented quickly but yet still heavy intoxicated. Pt was at  Bar tonight drinking since 1500 till time of EMS arrival.  Pt reports he drank lots of beer. . Pt is A & O x 4 . Pt is calm and cooperative . CHATA Chandler

## 2021-04-18 NOTE — ED NOTES
Pt refused to have labs drawn at this time D/T the cost of them . Pt educated on reasons why we want and need to collect them pt verbalized understanding and still refused .  MD Calos de paz.  GeraldineRN

## 2021-04-18 NOTE — ED NOTES
Pt alert and oriented x 4 Pt able to walk steady back and forth through ER hallway . Pt tolerated well No Nausea or dizziness noted. Pt verbalized he will call a taxi to take him home . CHATA Chandler

## 2021-04-18 NOTE — ED PROVIDER NOTES
History     Chief Complaint   Patient presents with     Alcohol Intoxication     HPI  Alec Bairse is a 25 year old male who started drinking around 3 PM this afternoon to celebrate a friend's birthday.  He continued to drink until he is brought to the emergency room around 130 after he was found unresponsive in a martinez at a bar.  Police or lowered him to the ground and he was noted to have sputum from coming from his mouth.  Once he was put on the ground he woke up but was definitely intoxicated but it was more alert.  He blew 266 on a breathalyzer test at the bar.  Patient stated that he probably drank around 18 beers at least.  He initially did not want to come to the ER but is willing to be cooperative at this time and understands that we need to make sure he safe to go home and that he has a sober friend to drive him home.    Allergies:  No Known Allergies    Problem List:    Patient Active Problem List    Diagnosis Date Noted     Chlamydial infection of lower genitourinary tract 04/19/2017     Priority: Medium     Advanced directives, counseling/discussion 03/21/2017     Priority: Medium     Advance Care Planning 3/21/2017: ACP Review of Chart / Resources Provided:  Reviewed chart for advance care plan.  Alec Baires has no plan or code status on file. Discussed available resources and provided with information. Confirmed code status reflects current choices pending further ACP discussions.  Confirmed/documented legally designated decision makers.  Added by Tosha Turner             Nasal turbinate hypertrophy 11/07/2014     Priority: Medium     DNS (deviated nasal septum) 11/07/2014     Priority: Medium     Nose fracture 11/07/2014     Priority: Medium        Past Medical History:    No past medical history on file.    Past Surgical History:    No past surgical history on file.    Family History:    Family History   Problem Relation Age of Onset     Congenital Anomalies Maternal Grandmother        Social  "History:  Marital Status:  Single [1]  Social History     Tobacco Use     Smoking status: Never Smoker     Smokeless tobacco: Current User     Types: Chew   Substance Use Topics     Alcohol use: Yes     Comment: sometimes     Drug use: Yes     Comment: marijuana        Medications:    augmented betamethasone dipropionate (DIPROLENE-AF) 0.05 % ointment  Emollient (CETAPHIL DAILY ADVANCE) LOTN  fluticasone (FLONASE) 50 MCG/ACT nasal spray  minocycline (MINOCIN,DYNACIN) 100 MG capsule  omeprazole 20 MG tablet  ranitidine (ZANTAC) 150 MG tablet  Soap & Cleansers (PARABEN-CETYL AND STEARYL ALCOHOL-PRO GL-SLS) external solution  sodium chloride (OCEAN) 0.65 % nasal spray  Triclosan (CETAPHIL ANTIBACTERIAL) 0.3 % BAR      Review of Systems   All other systems reviewed and are negative.      Physical Exam   BP: 137/87  Pulse: 109  Temp: 98  F (36.7  C)  Resp: 18  Height: 180.3 cm (5' 11\")  Weight: 77.1 kg (170 lb)  SpO2: 99 %      Physical Exam  Vitals signs and nursing note reviewed.   Constitutional:       Comments: Patient has a difficult time maintaining his gaze.   HENT:      Head: Normocephalic and atraumatic.   Neck:      Musculoskeletal: Normal range of motion and neck supple.   Cardiovascular:      Rate and Rhythm: Normal rate and regular rhythm.      Heart sounds: Normal heart sounds.   Pulmonary:      Effort: Pulmonary effort is normal.      Breath sounds: Normal breath sounds.   Abdominal:      General: Abdomen is flat. Bowel sounds are normal.      Palpations: Abdomen is soft.   Skin:     Capillary Refill: Capillary refill takes less than 2 seconds.   Neurological:      General: No focal deficit present.         ED Course   Patient seen and examined.  IV had been started prior to my arrival in the room.  1 L of normal saline will be given.  Patient refused any blood work as the sepsis protocol did fire however I do not not think that this is secondary to sepsis is more likely to be secondary to his drinking and " the situation.  We will continue to watch him carefully we will give him fluids and when he is clinically sober enough to leave I will allow him to leave.  Patient did have a difficult time following my conversation I feel that at this point he is at risk for ongoing issues.    Patient refused blood draw. Patient is on monitor and is sleeping.     Patient is awake. Walked easily without assistance. Will be sent home with sober .   Discharge orders done.       Procedures    No results found for this or any previous visit (from the past 24 hour(s)).    Medications   0.9% sodium chloride BOLUS (0 mLs Intravenous Stopped 4/18/21 0317)     Followed by   sodium chloride 0.9% infusion (0 mLs Intravenous Stopped 4/18/21 0544)       Assessments & Plan (with Medical Decision Making)     I have reviewed the nursing notes.    I have reviewed the findings, diagnosis, plan and need for follow up with the patient.    New Prescriptions    No medications on file       Final diagnoses:   Alcoholic intoxication without complication (H)       4/18/2021   [facility] UnitEtienne MD  04/18/21 0549

## 2021-10-03 ENCOUNTER — HEALTH MAINTENANCE LETTER (OUTPATIENT)
Age: 26
End: 2021-10-03

## 2022-05-14 ENCOUNTER — HEALTH MAINTENANCE LETTER (OUTPATIENT)
Age: 27
End: 2022-05-14

## 2022-09-04 ENCOUNTER — HEALTH MAINTENANCE LETTER (OUTPATIENT)
Age: 27
End: 2022-09-04

## 2023-06-03 ENCOUNTER — HEALTH MAINTENANCE LETTER (OUTPATIENT)
Age: 28
End: 2023-06-03

## 2024-06-17 PROBLEM — Z71.89 ADVANCED DIRECTIVES, COUNSELING/DISCUSSION: Status: RESOLVED | Noted: 2017-03-21 | Resolved: 2024-06-17

## (undated) RX ORDER — SODIUM CHLORIDE 9 MG/ML
INJECTION, SOLUTION INTRAVENOUS
Status: DISPENSED
Start: 2021-04-18